# Patient Record
Sex: MALE | Race: WHITE | URBAN - METROPOLITAN AREA
[De-identification: names, ages, dates, MRNs, and addresses within clinical notes are randomized per-mention and may not be internally consistent; named-entity substitution may affect disease eponyms.]

---

## 2017-01-08 ENCOUNTER — INPATIENT (INPATIENT)
Facility: HOSPITAL | Age: 47
LOS: 0 days | Discharge: HOME | DRG: 312 | End: 2017-01-09
Attending: HOSPITALIST | Admitting: FAMILY MEDICINE
Payer: COMMERCIAL

## 2017-01-08 VITALS
SYSTOLIC BLOOD PRESSURE: 140 MMHG | HEART RATE: 104 BPM | TEMPERATURE: 98 F | DIASTOLIC BLOOD PRESSURE: 90 MMHG | OXYGEN SATURATION: 96 % | RESPIRATION RATE: 18 BRPM

## 2017-01-08 DIAGNOSIS — Z98.890 OTHER SPECIFIED POSTPROCEDURAL STATES: Chronic | ICD-10-CM

## 2017-01-08 DIAGNOSIS — Z90.79 ACQUIRED ABSENCE OF OTHER GENITAL ORGAN(S): Chronic | ICD-10-CM

## 2017-01-08 DIAGNOSIS — R55 SYNCOPE AND COLLAPSE: ICD-10-CM

## 2017-01-08 DIAGNOSIS — J01.90 ACUTE SINUSITIS, UNSPECIFIED: ICD-10-CM

## 2017-01-08 LAB
ANION GAP SERPL CALC-SCNC: 12 MMOL/L — SIGNIFICANT CHANGE UP (ref 5–17)
APTT BLD: 32.7 SEC — SIGNIFICANT CHANGE UP (ref 27.5–37.4)
BUN SERPL-MCNC: 16 MG/DL — SIGNIFICANT CHANGE UP (ref 8–20)
CALCIUM SERPL-MCNC: 9.5 MG/DL — SIGNIFICANT CHANGE UP (ref 8.6–10.2)
CHLORIDE SERPL-SCNC: 98 MMOL/L — SIGNIFICANT CHANGE UP (ref 98–107)
CK SERPL-CCNC: 143 U/L — SIGNIFICANT CHANGE UP (ref 30–200)
CO2 SERPL-SCNC: 29 MMOL/L — SIGNIFICANT CHANGE UP (ref 22–29)
CREAT SERPL-MCNC: 0.92 MG/DL — SIGNIFICANT CHANGE UP (ref 0.5–1.3)
GLUCOSE SERPL-MCNC: 95 MG/DL — SIGNIFICANT CHANGE UP (ref 70–115)
HCT VFR BLD CALC: 49.6 % — SIGNIFICANT CHANGE UP (ref 42–52)
HGB BLD-MCNC: 17.5 G/DL — SIGNIFICANT CHANGE UP (ref 14–18)
INR BLD: 1.15 RATIO — SIGNIFICANT CHANGE UP (ref 0.88–1.16)
LACTATE BLDV-MCNC: 1.8 MMOL/L — SIGNIFICANT CHANGE UP (ref 0.7–2)
MCHC RBC-ENTMCNC: 32.1 PG — HIGH (ref 27–31)
MCHC RBC-ENTMCNC: 35.3 G/DL — SIGNIFICANT CHANGE UP (ref 32–36)
MCV RBC AUTO: 90.8 FL — SIGNIFICANT CHANGE UP (ref 80–94)
PLATELET # BLD AUTO: 227 K/UL — SIGNIFICANT CHANGE UP (ref 150–400)
POTASSIUM SERPL-MCNC: 3.9 MMOL/L — SIGNIFICANT CHANGE UP (ref 3.5–5.3)
POTASSIUM SERPL-SCNC: 3.9 MMOL/L — SIGNIFICANT CHANGE UP (ref 3.5–5.3)
PROTHROM AB SERPL-ACNC: 12.7 SEC — SIGNIFICANT CHANGE UP (ref 10–13.1)
RBC # BLD: 5.46 M/UL — SIGNIFICANT CHANGE UP (ref 4.6–6.2)
RBC # FLD: 13.4 % — SIGNIFICANT CHANGE UP (ref 11–15.6)
SODIUM SERPL-SCNC: 139 MMOL/L — SIGNIFICANT CHANGE UP (ref 135–145)
TROPONIN T SERPL-MCNC: <0.01 NG/ML — SIGNIFICANT CHANGE UP (ref 0–0.06)
WBC # BLD: 9.72 K/UL — SIGNIFICANT CHANGE UP (ref 4.8–10.8)
WBC # FLD AUTO: 9.72 K/UL — SIGNIFICANT CHANGE UP (ref 4.8–10.8)

## 2017-01-08 PROCEDURE — 70486 CT MAXILLOFACIAL W/O DYE: CPT | Mod: 26

## 2017-01-08 PROCEDURE — 99285 EMERGENCY DEPT VISIT HI MDM: CPT

## 2017-01-08 PROCEDURE — 93010 ELECTROCARDIOGRAM REPORT: CPT

## 2017-01-08 PROCEDURE — 70450 CT HEAD/BRAIN W/O DYE: CPT | Mod: 26

## 2017-01-08 RX ORDER — FLUTICASONE PROPIONATE 50 MCG
2 SPRAY, SUSPENSION NASAL ONCE
Qty: 0 | Refills: 0 | Status: COMPLETED | OUTPATIENT
Start: 2017-01-08 | End: 2017-01-08

## 2017-01-08 RX ORDER — ACETAMINOPHEN 500 MG
1000 TABLET ORAL ONCE
Qty: 0 | Refills: 0 | Status: COMPLETED | OUTPATIENT
Start: 2017-01-08 | End: 2017-01-08

## 2017-01-08 RX ORDER — ACETAMINOPHEN 500 MG
650 TABLET ORAL EVERY 6 HOURS
Qty: 0 | Refills: 0 | Status: DISCONTINUED | OUTPATIENT
Start: 2017-01-08 | End: 2017-01-09

## 2017-01-08 RX ORDER — SODIUM CHLORIDE 9 MG/ML
3 INJECTION INTRAMUSCULAR; INTRAVENOUS; SUBCUTANEOUS ONCE
Qty: 0 | Refills: 0 | Status: COMPLETED | OUTPATIENT
Start: 2017-01-08 | End: 2017-01-08

## 2017-01-08 RX ORDER — ENOXAPARIN SODIUM 100 MG/ML
40 INJECTION SUBCUTANEOUS DAILY
Qty: 0 | Refills: 0 | Status: DISCONTINUED | OUTPATIENT
Start: 2017-01-08 | End: 2017-01-09

## 2017-01-08 RX ORDER — CIPROFLOXACIN LACTATE 400MG/40ML
500 VIAL (ML) INTRAVENOUS EVERY 12 HOURS
Qty: 0 | Refills: 0 | Status: DISCONTINUED | OUTPATIENT
Start: 2017-01-08 | End: 2017-01-09

## 2017-01-08 RX ADMIN — Medication 2 SPRAY(S): at 22:00

## 2017-01-08 RX ADMIN — Medication 1000 MILLIGRAM(S): at 22:15

## 2017-01-08 RX ADMIN — SODIUM CHLORIDE 3 MILLILITER(S): 9 INJECTION INTRAMUSCULAR; INTRAVENOUS; SUBCUTANEOUS at 17:30

## 2017-01-08 RX ADMIN — Medication 400 MILLIGRAM(S): at 22:00

## 2017-01-08 NOTE — ED PROVIDER NOTE - MEDICAL DECISION MAKING DETAILS
PT WITH HX TESTICULAR TUMOR PRESENTS WITH SYNCOPAL EPISODE AT [facility]. IN ER SECOND EPISODE OF SYNCOPE OCCURRED. EKG NEGATIVE STEMI. LABS NORMAL. CT  HEAD ORDERED. WILL ADM IT AS OBSERVATION FOR FURTHER EVALUATION PT WITH HX TESTICULAR TUMOR PRESENTS WITH SYNCOPAL EPISODE AT [facility]. IN ER SECOND EPISODE OF SYNCOPE OCCURRED. EKG NEGATIVE STEMI. LABS NORMAL. CT  HEAD/SINUSES  ORDERED. WILL ADMIT  AS OBSERVATION FOR FURTHER EVALUATION S/P TWO SYNCOPAL EPISODES. DR JACOBO TO ADMIT

## 2017-01-08 NOTE — ED PROVIDER NOTE - OBJECTIVE STATEMENT
PT IS TWO WEEKS S/P REMOVAL OF TESTICLE FOR TUMOR. PT HAS BEEN DOUING WELL AND 1 1/2 WEEKS AFTER SURGERY HE RETURNED TO WORK IN [facility]. PT TRAVELS BY TRAIN AND SUBWAY.  PT STATES HE FELT LIKE HE WAS COMING DOWN WITH A COLD OR SINUS INFECTION SO HE WENT TO THE . WHILE THERE JUST SPEAKING TO THE PHYSICIAN HE HAD A SYNCOPAL EPISODE.  PT SENT TO ER FOR FURTHER EVALUATION.  LABS ETC ORDERED TO EVALUATE PATIENT . PT HAD  A SECOND SYNCOPAL EPISODE WHILE LYING ON GURNEY.

## 2017-01-08 NOTE — ED ADULT NURSE NOTE - OBJECTIVE STATEMENT
Patient recd this evevning A/Ox3, VSS, denies chest pain or sob.  Respirations are even and unlabored, lungs cta, +bowel x4 quads, abdomen soft, nontender/nondistended, skin w/d/i. Patient reports sinus/upper respiratory infection for about 1 weeks, went to urgent care and had a syncopal episode.  Patient is also s/p testicle removal and hernia repair.  Patient placed on cardiac monitor, tachycardiac 108, BP-within normal limits, temp-99.5 oral, refused rectal temp.  Patient began to hyperventilate, became diaphoretic, placed lined and lab-Dr. Alicea at bedside assessing patient, will continue to monitor.

## 2017-01-08 NOTE — ED PROVIDER NOTE - CHPI ED SYMPTOMS NEG
no orthopnea/no dizziness/no chest discomfort/no palpitations/no vomiting/no back pain/no nausea/no chest pain/no chills/no cough/no nervousness/no numbness/no jaw pain/no malaise/no chest tightness/no weakness/no left arm pain/no decreased eating/drinking

## 2017-01-08 NOTE — H&P ADULT. - HISTORY OF PRESENT ILLNESS
45 y/o male s/p right Orchiectomy 2 weeks ago 2nd to testicular cancer, with h/o nasal congestion for the last few days on dayquil, was in the urgent care center today where he had syncope while sitting on chair. patient was transferred to the ER where he had the 2nd syncopal episode while the nurse was inserting IV line. no h/o chest pain or palpitation.

## 2017-01-09 VITALS
RESPIRATION RATE: 18 BRPM | OXYGEN SATURATION: 98 % | SYSTOLIC BLOOD PRESSURE: 113 MMHG | HEART RATE: 80 BPM | DIASTOLIC BLOOD PRESSURE: 70 MMHG

## 2017-01-09 DIAGNOSIS — R55 SYNCOPE AND COLLAPSE: ICD-10-CM

## 2017-01-09 PROCEDURE — 93005 ELECTROCARDIOGRAM TRACING: CPT

## 2017-01-09 PROCEDURE — G0378: CPT

## 2017-01-09 PROCEDURE — 96374 THER/PROPH/DIAG INJ IV PUSH: CPT

## 2017-01-09 PROCEDURE — 99222 1ST HOSP IP/OBS MODERATE 55: CPT

## 2017-01-09 PROCEDURE — 85610 PROTHROMBIN TIME: CPT

## 2017-01-09 PROCEDURE — 85027 COMPLETE CBC AUTOMATED: CPT

## 2017-01-09 PROCEDURE — 82550 ASSAY OF CK (CPK): CPT

## 2017-01-09 PROCEDURE — 85730 THROMBOPLASTIN TIME PARTIAL: CPT

## 2017-01-09 PROCEDURE — 80048 BASIC METABOLIC PNL TOTAL CA: CPT

## 2017-01-09 PROCEDURE — 99285 EMERGENCY DEPT VISIT HI MDM: CPT | Mod: 25

## 2017-01-09 PROCEDURE — 99238 HOSP IP/OBS DSCHRG MGMT 30/<: CPT

## 2017-01-09 PROCEDURE — 94640 AIRWAY INHALATION TREATMENT: CPT

## 2017-01-09 PROCEDURE — 70450 CT HEAD/BRAIN W/O DYE: CPT

## 2017-01-09 PROCEDURE — 84484 ASSAY OF TROPONIN QUANT: CPT

## 2017-01-09 PROCEDURE — 93306 TTE W/DOPPLER COMPLETE: CPT

## 2017-01-09 PROCEDURE — 85379 FIBRIN DEGRADATION QUANT: CPT

## 2017-01-09 PROCEDURE — 83605 ASSAY OF LACTIC ACID: CPT

## 2017-01-09 RX ORDER — LORATADINE 10 MG/1
1 TABLET ORAL
Qty: 14 | Refills: 0 | OUTPATIENT
Start: 2017-01-09 | End: 2017-01-23

## 2017-01-09 RX ORDER — FLUTICASONE PROPIONATE 50 MCG
2 SPRAY, SUSPENSION NASAL
Qty: 1 | Refills: 0 | OUTPATIENT
Start: 2017-01-09 | End: 2017-01-23

## 2017-01-09 RX ORDER — LORATADINE 10 MG/1
10 TABLET ORAL DAILY
Qty: 0 | Refills: 0 | Status: DISCONTINUED | OUTPATIENT
Start: 2017-01-09 | End: 2017-01-09

## 2017-01-09 RX ORDER — FLUTICASONE PROPIONATE 50 MCG
2 SPRAY, SUSPENSION NASAL DAILY
Qty: 0 | Refills: 0 | Status: DISCONTINUED | OUTPATIENT
Start: 2017-01-09 | End: 2017-01-09

## 2017-01-09 RX ADMIN — Medication 500 MILLIGRAM(S): at 05:41

## 2017-01-09 NOTE — DISCHARGE NOTE ADULT - CARE PROVIDER_API CALL
Weil, Peter A (MD), Critical Care Medicine; Internal Medicine; Pulmonary Disease  [address] [place] [address]  Phone: [phone]  Fax: [phone] Weil, Peter A (MD), Critical Care Medicine; Internal Medicine; Pulmonary Disease  [address] [facility] [address]  [place] [address]  Phone: [phone]  Fax: [phone]    Jae Kearney ([facility]), Cardiology; Cardiovascular Disease; Interventional Cardiology  [address] [place] [id number]  Phone: [phone]  Fax: [phone]

## 2017-01-09 NOTE — DISCHARGE NOTE ADULT - NS MD DC FALL RISK RISK
For information on Fall & Injury Prevention, visit [email or web address]

## 2017-01-09 NOTE — DISCHARGE NOTE ADULT - CARE PLAN
Principal Discharge DX:	Syncope, unspecified syncope type  Instructions for follow-up, activity and diet:	Keep well hydrated. Follow up with your PMD/cardiologist Dr. Peter Weil within 3 days.  Secondary Diagnosis:	Acute sinusitis, recurrence not specified, unspecified location  Instructions for follow-up, activity and diet:	Keep well hydrated. Take discharge medications as directed. Principal Discharge DX:	Syncope, unspecified syncope type  Goal:	follow up with cardio  Instructions for follow-up, activity and diet:	Keep well hydrated. Follow up with your PMD/cardiologist Dr. Peter Weil within 2 days.  Secondary Diagnosis:	Acute sinusitis, recurrence not specified, unspecified location  Instructions for follow-up, activity and diet:	Keep well hydrated. Take discharge medications as directed. Principal Discharge DX:	Syncope, unspecified syncope type  Goal:	thought to be secondary to vasovagal  Instructions for follow-up, activity and diet:	Keep well hydrated. Follow up with your PMD/cardiologist Dr. Peter Weil within 2 days.  Secondary Diagnosis:	Acute sinusitis, recurrence not specified, unspecified location  Instructions for follow-up, activity and diet:	Keep well hydrated. Take discharge medications as directed.

## 2017-01-09 NOTE — DISCHARGE NOTE ADULT - PATIENT PORTAL LINK FT
“You can access the [facility] Patient Portal, offered by [facility], by registering with the following website: [email or web address]”

## 2017-01-09 NOTE — DISCHARGE NOTE ADULT - PLAN OF CARE
Keep well hydrated. Follow up with your PMD/cardiologist Dr. Peter Weil within 3 days. Keep well hydrated. Take discharge medications as directed. follow up with cardio Keep well hydrated. Follow up with your PMD/cardiologist Dr. Peter Weil within 2 days. thought to be secondary to vasovagal

## 2017-01-09 NOTE — CONSULT NOTE ADULT - ASSESSMENT
A 47 y/o male with PMHx of testicular cancer s/p orchiectomy 2 weeks ago presented with URI and 2 episoes of syncope or presyncope ( 2nd episode

## 2017-01-09 NOTE — DISCHARGE NOTE ADULT - CARE PROVIDERS DIRECT ADDRESSES
,[email or web address],[email or web address],DirectAddress_Unknown

## 2017-01-09 NOTE — CONSULT NOTE ADULT - SUBJECTIVE AND OBJECTIVE BOX
CARDIOLOGY CONSULTATION NOTE ([facility])  Consult requested by:  Dorinda Gamino    Reason for Consultation: syncope    History obtained by: Patient and medical record     obtained: No    Chief complaint:    Patient is a 46y old  Male who presents with a chief complaint of syncope x2 today (09 Jan 2017 11:35)      HPI:  47 y/o male s/p right Orchiectomy 2 weeks ago 2nd to testicular cancer, with a recent history of URI (nasal congestion, sinusitis) , was in the urgent care center today where he developed a syncopal episode where he felt dizzy , clammy, and his wife mentioned to him that he was pale, this lasted for a few and then he recovered. Again in ER, while blood is being drawn and it was uncomfortable, he had another episode of feeling dizzy and almost was about to pass out. He denies prior syncopal event (apart from these 2 events) He has been tired in the last 2 days primarily because of URI. He denies chest pain, SOB, palpitations, N/V.       REVIEW OF SYMPTOMS: Cardiovascular:  as per HPI  Respiratory: +cough,   ;   Genitourinary:  No dysuria, no hematuria; Gastrointestinal:  No nausea, no vomiting. No diarrhea.  No abdominal pain. No dark color stool, no melena ; Neurological: No headache, no dizziness, no slurred speech;  Psychiatric: No agitation, no anxiety.  ALL OTHER REVIEW OF SYSTEMS ARE NEGATIVE.    ALLERGIES: Allergies    NKDA        MEDICATIONS  (STANDING):  enoxaparin Injectable 40milliGRAM(s) SubCutaneous daily  levoFLOXacin  Tablet 500milliGRAM(s) Oral every 24 hours  fluticasone propionate 50 MICROgram(s)/spray Nasal Spray 2Spray(s) Both Nostrils daily  loratadine 10milliGRAM(s) Oral daily    MEDICATIONS  (PRN):  acetaminophen    Suspension 650milliGRAM(s) Oral every 6 hours PRN For Temp greater than 38 C (100.4 F)      CURRENT MEDICATIONS:    loratadine  enoxaparin Injectable  levoFLOXacin  Tablet  fluticasone propionate 50 MICROgram(s)/spray Nasal Home  :    PAST MEDICAL HISTORY  Testicular tumor      PAST SURGICAL HISTORY  H/O removal of testicle  History of hernia repair      FAMILY HISTORY:  Family history of cardiomyopathy in his father  No pertinent family history in first degree relatives      SOCIAL HISTORY:  Denies smoking/alcohol/drugs        Vital Signs Last 24 Hrs  T(C): 37.4, Max: 38.1 (01-08 @ 19:42)  T(F): 99.4, Max: 100.6 (01-08 @ 19:42)  HR: 80 (78 - 107)  BP: 113/70 (112/62 - 141/93)  BP(mean): --  RR: 18 (18 - 24)  SpO2: 98% (96% - 99%)      PHYSICAL EXAM:  Constitutional: Comfortable . No acute distress.   HEENT: Atraumatic and normcephalic , neck is supple . no JVD. No carotid bruit.    CNS: A&Ox3. No focal deficits. EOMI.   Lymph Nodes: Cervical : Not palpable.  Respiratory: CTAB  Cardiovascular: S1S2 RRR. No murmur/rubs or gallop.  Gastrointestinal: Soft non-tender and non distended . +Bowel sounds. negative Guy's sign.  Extremities: No edema.   Psychiatric: Calm . no agitation.  Skin: No skin lesions    Intake and output:     LABS:                        17.5   9.72  )-----------( 227      ( 08 Jan 2017 17:29 )             49.6     08 Jan 2017 17:29    139    |  98     |  16.0   ----------------------------<  95     3.9     |  29.0   |  0.92     Ca    9.5        08 Jan 2017 17:29      CARDIAC MARKERS ( 08 Jan 2017 17:29 )  x     / <0.01 ng/mL / 143 U/L / x     / x        ;p-BNP=  PT/INR - ( 08 Jan 2017 17:29 )   PT: 12.7 sec;   INR: 1.15 ratio         PTT - ( 08 Jan 2017 17:29 )  PTT:32.7 sec        ECG: NSR, no St-T wave changes    RADIOLOGY & ADDITIONAL STUDIES:     ECHO FINDINGS: Date: 1/9/2017             Overal normal LV function, no valvular abnormalities ( preliminary read)

## 2017-01-09 NOTE — DISCHARGE NOTE ADULT - HOSPITAL COURSE
Pt w PMH testicular CA was admitted after two episodes syncope. Pt went to  due to sinus congestion and had first syncopal episode while there. Was advised to come to ED and while having IV insertion in ED had second episode syncope. Pt is being discharged after admission due to he was cleared by cardiologist Dr. Kearney after having normal echo. Pt is feeling well and desiring to go home as he has an outpatient oncology appt later today that he does not want to miss. Pt has been advised on the importance of following up with his internist/cardiologist Dr. Peter Weil within 2 days. Pt advised to return to ED immediately if he has further episode syncope, weakness, headache, dizziness, chest pain, heart palpitations, fevers/chills. Pt w PMH testicular CA was admitted after two episodes syncope. Pt went to  due to sinus congestion and had first syncopal episode while there. Was advised to come to ED and while having IV insertion in ED had second episode syncope. Pt is being discharged after admission due to he was cleared by cardiologist Dr. Finley after having normal echo. Pt is feeling well and desiring to go home as he has an outpatient oncology appt later today that he does not want to miss. Pt has been advised on the importance of following up with his internist/cardiologist Dr. Peter Weil within 2 days. Pt advised to return to ED immediately if he has further episode syncope, weakness, headache, dizziness, chest pain, heart palpitations, fevers/chills.     Patient seen by cardio dr finley , no arrythmia, syncope thought to be secondary to vasovagal episode.  patient CT scan showed sinusitis patient started on nasonex, claritin and levaquin    GENERAL: NAD, well-groomed, well-developed without complaints  HEAD:  Atraumatic, Normocephalic  EYES: EOMI, PERRLA, conjunctiva and sclera clear  ENMT: No tonsillar erythema, exudates, or enlargement; Moist mucous membranes, Good dentition, No lesions  NECK: Supple, No JVD, Normal thyroid  NERVOUS SYSTEM:  Alert & Oriented X3, Good concentration; Motor Strength 5/5 B/L upper and lower extremities;   CHEST/LUNG: Clear to percussion bilaterally; No rales, rhonchi, wheezing, or rubs  HEART: Regular rate and rhythm; No murmurs, rubs, or gallops  ABDOMEN: Soft, Nontender, Nondistended; Bowel sounds present  EXTREMITIES:  2+ Peripheral Pulses, No clubbing, cyanosis, or edema  LYMPH: No lymphadenopathy noted  SKIN: No rashes or lesions Pt w PMH testicular CA was admitted after two episodes syncope. Pt went to  due to sinus congestion and had first syncopal episode while there. Was advised to come to ED and while having IV insertion in ED had second episode syncope. Pt is being discharged after admission due to he was cleared by cardiologist Dr. Finley after having normal echo. Pt is feeling well and desiring to go home as he has an outpatient oncology appt later today that he does not want to miss. Pt has been advised on the importance of following up with his internist/cardiologist Dr. Peter Weil within 2 days. Pt advised to return to ED immediately if he has further episode syncope, weakness, headache, dizziness, chest pain, heart palpitations, fevers/chills.     Patient seen by cardio dr finley , no arrythmia, syncope thought to be secondary to vasovagal episode. DR Jae finley reviewed Echo and is reported WNL   patient CT scan showed sinusitis patient started on nasonex, claritin and levaquin    GENERAL: NAD, well-groomed, well-developed without complaints  HEAD:  Atraumatic, Normocephalic  EYES: EOMI, PERRLA, conjunctiva and sclera clear  ENMT: No tonsillar erythema, exudates, or enlargement; Moist mucous membranes, Good dentition, No lesions  NECK: Supple, No JVD, Normal thyroid  NERVOUS SYSTEM:  Alert & Oriented X3, Good concentration; Motor Strength 5/5 B/L upper and lower extremities;   CHEST/LUNG: Clear to percussion bilaterally; No rales, rhonchi, wheezing, or rubs  HEART: Regular rate and rhythm; No murmurs, rubs, or gallops  ABDOMEN: Soft, Nontender, Nondistended; Bowel sounds present  EXTREMITIES:  2+ Peripheral Pulses, No clubbing, cyanosis, or edema  LYMPH: No lymphadenopathy noted  SKIN: No rashes or lesions

## 2017-01-09 NOTE — ED ADULT NURSE REASSESSMENT NOTE - NS ED NURSE REASSESS COMMENT FT1
Cardio Dr Kearney with pt. We called echo to be done now, if good DC home to keep oncology appointment. Spoke to Dr Vergara and she is aware and she spoke to cardiologist.
Patient resting comfortably in bed, CM in place. Patient does not appear to be in any apparent distress at this time. will continue to monitor patient
Patient resting comfortably, appears to be in NAD. Will continue to monitor patient
Report received from previous shift RN. Pt received sitting on stretcher in NAD. Pt AOx3 patient states that he has some congestion, and is requesting to have antibiotics, will notify MD. Neuro FREDDY. ARVIND. Lungs CTA, RR even unlabored. Ab soft non tender, + bowel sounds x 4quads. Denies Nausea, Vomiting, Diarrhea. Skin warm, dry, color appropriate for age and race. Awaiting CT scan and dispo will continue to monitor

## 2017-01-09 NOTE — CONSULT NOTE ADULT - PROBLEM SELECTOR RECOMMENDATION 9
Likely vasovagal in nature ( description and precipitating factors )   Echo within normal   Recommended adequate hydration and avoidance of precipitating factors.   Unlikely arrhythmic in nature

## 2017-02-10 ENCOUNTER — APPOINTMENT (OUTPATIENT)
Dept: DERMATOLOGY | Facility: CLINIC | Age: 47
End: 2017-02-10

## 2017-04-04 ENCOUNTER — EMERGENCY (EMERGENCY)
Facility: HOSPITAL | Age: 47
LOS: 1 days | Discharge: HOME | End: 2017-04-04
Attending: EMERGENCY MEDICINE
Payer: COMMERCIAL

## 2017-04-04 VITALS
TEMPERATURE: 98 F | OXYGEN SATURATION: 98 % | WEIGHT: 244.93 LBS | DIASTOLIC BLOOD PRESSURE: 90 MMHG | HEART RATE: 115 BPM | RESPIRATION RATE: 18 BRPM | HEIGHT: 71 IN | SYSTOLIC BLOOD PRESSURE: 146 MMHG

## 2017-04-04 VITALS
OXYGEN SATURATION: 99 % | DIASTOLIC BLOOD PRESSURE: 77 MMHG | HEART RATE: 102 BPM | RESPIRATION RATE: 16 BRPM | SYSTOLIC BLOOD PRESSURE: 123 MMHG

## 2017-04-04 DIAGNOSIS — E83.42 HYPOMAGNESEMIA: ICD-10-CM

## 2017-04-04 DIAGNOSIS — R06.02 SHORTNESS OF BREATH: ICD-10-CM

## 2017-04-04 DIAGNOSIS — Z98.890 OTHER SPECIFIED POSTPROCEDURAL STATES: ICD-10-CM

## 2017-04-04 DIAGNOSIS — Z90.79 ACQUIRED ABSENCE OF OTHER GENITAL ORGAN(S): Chronic | ICD-10-CM

## 2017-04-04 DIAGNOSIS — M79.632 PAIN IN LEFT FOREARM: ICD-10-CM

## 2017-04-04 DIAGNOSIS — Z98.890 OTHER SPECIFIED POSTPROCEDURAL STATES: Chronic | ICD-10-CM

## 2017-04-04 LAB
ABO RH CONFIRMATION: SIGNIFICANT CHANGE UP
ALBUMIN SERPL ELPH-MCNC: 3.7 G/DL — SIGNIFICANT CHANGE UP (ref 3.3–5.2)
ALP SERPL-CCNC: 55 U/L — SIGNIFICANT CHANGE UP (ref 40–120)
ALT FLD-CCNC: 20 U/L — SIGNIFICANT CHANGE UP
ANION GAP SERPL CALC-SCNC: 13 MMOL/L — SIGNIFICANT CHANGE UP (ref 5–17)
ANISOCYTOSIS BLD QL: SLIGHT — SIGNIFICANT CHANGE UP
APPEARANCE UR: CLEAR — SIGNIFICANT CHANGE UP
AST SERPL-CCNC: 13 U/L — SIGNIFICANT CHANGE UP
BACTERIA # UR AUTO: ABNORMAL
BASOPHILS # BLD AUTO: 0 K/UL — SIGNIFICANT CHANGE UP (ref 0–0.2)
BILIRUB SERPL-MCNC: 0.5 MG/DL — SIGNIFICANT CHANGE UP (ref 0.4–2)
BILIRUB UR-MCNC: NEGATIVE — SIGNIFICANT CHANGE UP
BLD GP AB SCN SERPL QL: SIGNIFICANT CHANGE UP
BUN SERPL-MCNC: 26 MG/DL — HIGH (ref 8–20)
CALCIUM SERPL-MCNC: 9.3 MG/DL — SIGNIFICANT CHANGE UP (ref 8.6–10.2)
CHLORIDE SERPL-SCNC: 93 MMOL/L — LOW (ref 98–107)
CO2 SERPL-SCNC: 28 MMOL/L — SIGNIFICANT CHANGE UP (ref 22–29)
COLOR SPEC: SIGNIFICANT CHANGE UP
CREAT SERPL-MCNC: 0.87 MG/DL — SIGNIFICANT CHANGE UP (ref 0.5–1.3)
DIFF PNL FLD: NEGATIVE — SIGNIFICANT CHANGE UP
EPI CELLS # UR: SIGNIFICANT CHANGE UP
GLUCOSE SERPL-MCNC: 137 MG/DL — HIGH (ref 70–115)
GLUCOSE UR QL: NEGATIVE MG/DL — SIGNIFICANT CHANGE UP
HCT VFR BLD CALC: 29.4 % — LOW (ref 42–52)
HGB BLD-MCNC: 10.5 G/DL — LOW (ref 14–18)
KETONES UR-MCNC: NEGATIVE — SIGNIFICANT CHANGE UP
LEUKOCYTE ESTERASE UR-ACNC: NEGATIVE — SIGNIFICANT CHANGE UP
LIDOCAIN IGE QN: 19 U/L — LOW (ref 22–51)
LYMPHOCYTES # BLD AUTO: 11 % — LOW (ref 20–55)
LYMPHOCYTES # BLD AUTO: 2.6 K/UL — SIGNIFICANT CHANGE UP (ref 1–4.8)
MACROCYTES BLD QL: SLIGHT — SIGNIFICANT CHANGE UP
MAGNESIUM SERPL-MCNC: 1.2 MG/DL — LOW (ref 1.8–2.5)
MCHC RBC-ENTMCNC: 32.1 PG — HIGH (ref 27–31)
MCHC RBC-ENTMCNC: 35.7 G/DL — SIGNIFICANT CHANGE UP (ref 32–36)
MCV RBC AUTO: 89.9 FL — SIGNIFICANT CHANGE UP (ref 80–94)
MICROCYTES BLD QL: SLIGHT — SIGNIFICANT CHANGE UP
MONOCYTES # BLD AUTO: 0.2 K/UL — SIGNIFICANT CHANGE UP (ref 0–0.8)
MONOCYTES NFR BLD AUTO: 2 % — LOW (ref 3–10)
NEUTROPHILS # BLD AUTO: 20.3 K/UL — HIGH (ref 1.8–8)
NEUTROPHILS NFR BLD AUTO: 84 % — HIGH (ref 37–73)
NEUTS BAND # BLD: 3 % — SIGNIFICANT CHANGE UP (ref 0–8)
NITRITE UR-MCNC: NEGATIVE — SIGNIFICANT CHANGE UP
PH UR: 6 — SIGNIFICANT CHANGE UP (ref 4.8–8)
PHOSPHATE SERPL-MCNC: 3.8 MG/DL — SIGNIFICANT CHANGE UP (ref 2.4–4.7)
PLAT MORPH BLD: NORMAL — SIGNIFICANT CHANGE UP
PLATELET # BLD AUTO: 157 K/UL — SIGNIFICANT CHANGE UP (ref 150–400)
POIKILOCYTOSIS BLD QL AUTO: SLIGHT — SIGNIFICANT CHANGE UP
POTASSIUM SERPL-MCNC: 3.6 MMOL/L — SIGNIFICANT CHANGE UP (ref 3.5–5.3)
POTASSIUM SERPL-SCNC: 3.6 MMOL/L — SIGNIFICANT CHANGE UP (ref 3.5–5.3)
PROT SERPL-MCNC: 5.9 G/DL — LOW (ref 6.6–8.7)
PROT UR-MCNC: NEGATIVE MG/DL — SIGNIFICANT CHANGE UP
RBC # BLD: 3.27 M/UL — LOW (ref 4.6–6.2)
RBC # FLD: 16.9 % — HIGH (ref 11–15.6)
RBC BLD AUTO: ABNORMAL
RBC CASTS # UR COMP ASSIST: NEGATIVE /HPF — SIGNIFICANT CHANGE UP (ref 0–4)
SODIUM SERPL-SCNC: 134 MMOL/L — LOW (ref 135–145)
SP GR SPEC: 1.01 — SIGNIFICANT CHANGE UP (ref 1.01–1.02)
TYPE + AB SCN PNL BLD: SIGNIFICANT CHANGE UP
UROBILINOGEN FLD QL: NEGATIVE MG/DL — SIGNIFICANT CHANGE UP
WBC # BLD: 25 K/UL — HIGH (ref 4.8–10.8)
WBC # FLD AUTO: 25 K/UL — HIGH (ref 4.8–10.8)
WBC UR QL: SIGNIFICANT CHANGE UP

## 2017-04-04 PROCEDURE — 71045 X-RAY EXAM CHEST 1 VIEW: CPT

## 2017-04-04 PROCEDURE — 80053 COMPREHEN METABOLIC PANEL: CPT

## 2017-04-04 PROCEDURE — 83690 ASSAY OF LIPASE: CPT

## 2017-04-04 PROCEDURE — 83735 ASSAY OF MAGNESIUM: CPT

## 2017-04-04 PROCEDURE — 85027 COMPLETE CBC AUTOMATED: CPT

## 2017-04-04 PROCEDURE — 86900 BLOOD TYPING SEROLOGIC ABO: CPT

## 2017-04-04 PROCEDURE — 86901 BLOOD TYPING SEROLOGIC RH(D): CPT

## 2017-04-04 PROCEDURE — 99284 EMERGENCY DEPT VISIT MOD MDM: CPT

## 2017-04-04 PROCEDURE — 86850 RBC ANTIBODY SCREEN: CPT

## 2017-04-04 PROCEDURE — 36415 COLL VENOUS BLD VENIPUNCTURE: CPT

## 2017-04-04 PROCEDURE — 99284 EMERGENCY DEPT VISIT MOD MDM: CPT | Mod: 25

## 2017-04-04 PROCEDURE — 84100 ASSAY OF PHOSPHORUS: CPT

## 2017-04-04 PROCEDURE — 93010 ELECTROCARDIOGRAM REPORT: CPT

## 2017-04-04 PROCEDURE — 81001 URINALYSIS AUTO W/SCOPE: CPT

## 2017-04-04 PROCEDURE — 96374 THER/PROPH/DIAG INJ IV PUSH: CPT

## 2017-04-04 PROCEDURE — 93005 ELECTROCARDIOGRAM TRACING: CPT

## 2017-04-04 PROCEDURE — 71010: CPT | Mod: 26

## 2017-04-04 RX ORDER — SODIUM CHLORIDE 9 MG/ML
1000 INJECTION INTRAMUSCULAR; INTRAVENOUS; SUBCUTANEOUS ONCE
Qty: 0 | Refills: 0 | Status: COMPLETED | OUTPATIENT
Start: 2017-04-04 | End: 2017-04-04

## 2017-04-04 RX ORDER — MAGNESIUM SULFATE 500 MG/ML
2 VIAL (ML) INJECTION ONCE
Qty: 0 | Refills: 0 | Status: COMPLETED | OUTPATIENT
Start: 2017-04-04 | End: 2017-04-04

## 2017-04-04 RX ADMIN — Medication 50 GRAM(S): at 15:58

## 2017-04-04 RX ADMIN — SODIUM CHLORIDE 3000 MILLILITER(S): 9 INJECTION INTRAMUSCULAR; INTRAVENOUS; SUBCUTANEOUS at 15:58

## 2017-04-04 NOTE — ED PROVIDER NOTE - CARE PLAN
Principal Discharge DX:	Myalgia Principal Discharge DX:	Myalgia  Secondary Diagnosis:	Hypomagnesemia

## 2017-04-04 NOTE — ED PROVIDER NOTE - MEDICAL DECISION MAKING DETAILS
spoke with pts on call oncologist out of [place] reviewed labs agrees with dispo home . perhaps neulasta causing genralized myalgia and weakness. no signs ddvt or deep infection on left foreamr exam he is to return to ed for any intractable HA persitent vomigint fevers or new onset motor or sensory deficits.

## 2017-04-04 NOTE — ED PROVIDER NOTE - SKIN, MLM
Skin normal color for race, warm, dry and intact. No evidence of rash. + right sided chest wall port

## 2017-04-04 NOTE — ED PROVIDER NOTE - OBJECTIVE STATEMENT
pt presents with genralized myalgia weakness and some left forearm pain no trauma no h/o dvt or pe . finshed last chemo one wekk prior for testicular cancner and had shot neulasta yesterday. denies fever. denies HA or neck pain. no chest pain or sob. no abd pain. no n/v/d. no urinary f/u/d. no back pain. no motor or sensory deficits. denies drug use. no recent travel. no rash. no other acute issues symptoms or concerns

## 2017-04-04 NOTE — ED ADULT TRIAGE NOTE - CHIEF COMPLAINT QUOTE
Pt currently being treated for Testicular cancer with mets to lymph nodes. Pt states he got chemo on Friday and woke up this AM with SOB and left arm pain. Pt also reports associated nausea and anxiety. Mask in place, denies any known fever or chills.

## 2017-04-04 NOTE — ED ADULT NURSE NOTE - OBJECTIVE STATEMENT
being treated for Testicular cancer with mets to lymph nodes. Pt states he got chemo on Friday and woke up this AM with SOB and left arm pain. Pt also reports associated nausea and anxiety. Mask in place, denies any known fever or chills. states having a lot of anxiety with pain in left arm feels tender to touch states he had surface embolism to left arms states feels like stabbing pain when touched left arm is not swollen, no redness  noted not hot to touch pt not diaphoretic pt denies injury to left arm. shot of nulasta yesterday to regenerate wbc. pt states he has port to upper right chest. pt states he has  nausea. pt states he feels unbalanced

## 2017-11-04 RX ORDER — PEGFILGRASTIM-CBQV 6 MG/.6ML
0 INJECTION, SOLUTION SUBCUTANEOUS
Qty: 0 | Refills: 0 | COMMUNITY

## 2017-11-05 PROBLEM — D49.59 NEOPLASM OF UNSPECIFIED BEHAVIOR OF OTHER GENITOURINARY ORGAN: Chronic | Status: ACTIVE | Noted: 2017-01-08

## 2020-12-08 NOTE — ED ADULT NURSE NOTE - CAS TRG GEN SKIN CONDITION
Goal Outcome Evaluation:  Plan of Care Reviewed With: patient Interventions implemented as appropriate.         Warm/Dry

## 2025-03-31 PROCEDURE — 90792 PSYCH DIAG EVAL W/MED SRVCS: CPT | Mod: 95
